# Patient Record
Sex: MALE
[De-identification: names, ages, dates, MRNs, and addresses within clinical notes are randomized per-mention and may not be internally consistent; named-entity substitution may affect disease eponyms.]

---

## 2018-05-08 ENCOUNTER — HOSPITAL ENCOUNTER (INPATIENT)
Dept: HOSPITAL 88 - ER | Age: 43
LOS: 4 days | Discharge: HOME | DRG: 103 | End: 2018-05-12
Attending: INTERNAL MEDICINE | Admitting: INTERNAL MEDICINE
Payer: COMMERCIAL

## 2018-05-08 VITALS — SYSTOLIC BLOOD PRESSURE: 122 MMHG | DIASTOLIC BLOOD PRESSURE: 74 MMHG

## 2018-05-08 VITALS — BODY MASS INDEX: 41.06 KG/M2 | HEIGHT: 72 IN | WEIGHT: 303.13 LBS

## 2018-05-08 VITALS — DIASTOLIC BLOOD PRESSURE: 74 MMHG | SYSTOLIC BLOOD PRESSURE: 122 MMHG

## 2018-05-08 DIAGNOSIS — E11.65: ICD-10-CM

## 2018-05-08 DIAGNOSIS — I10: ICD-10-CM

## 2018-05-08 DIAGNOSIS — G43.109: Primary | ICD-10-CM

## 2018-05-08 DIAGNOSIS — E78.5: ICD-10-CM

## 2018-05-08 DIAGNOSIS — E66.01: ICD-10-CM

## 2018-05-08 DIAGNOSIS — E87.6: ICD-10-CM

## 2018-05-08 DIAGNOSIS — R06.83: ICD-10-CM

## 2018-05-08 DIAGNOSIS — E86.0: ICD-10-CM

## 2018-05-08 DIAGNOSIS — G83.84: ICD-10-CM

## 2018-05-08 LAB
ALBUMIN SERPL-MCNC: 4 G/DL (ref 3.5–5)
ALBUMIN/GLOB SERPL: 1.1 {RATIO} (ref 0.8–2)
ALP SERPL-CCNC: 86 IU/L (ref 40–150)
ALT SERPL-CCNC: 46 IU/L (ref 0–55)
ANION GAP SERPL CALC-SCNC: 13.1 MMOL/L (ref 8–16)
BASOPHILS # BLD AUTO: 0 10*3/UL (ref 0–0.1)
BASOPHILS NFR BLD AUTO: 0.4 % (ref 0–1)
BUN SERPL-MCNC: 12 MG/DL (ref 7–26)
BUN/CREAT SERPL: 15 (ref 6–25)
CALCIUM SERPL-MCNC: 9.4 MG/DL (ref 8.4–10.2)
CHLORIDE SERPL-SCNC: 105 MMOL/L (ref 98–107)
CK MB SERPL-MCNC: 2.5 NG/ML (ref 0–5)
CK MB SERPL-MCNC: 2.6 NG/ML (ref 0–5)
CK SERPL-CCNC: 175 IU/L (ref 30–200)
CK SERPL-CCNC: 209 IU/L (ref 30–200)
CO2 SERPL-SCNC: 25 MMOL/L (ref 22–29)
DEPRECATED APTT PLAS QN: 27.4 SECONDS (ref 23.8–35.5)
DEPRECATED INR PLAS: 1.09
DEPRECATED NEUTROPHILS # BLD AUTO: 6.4 10*3/UL (ref 2.1–6.9)
EGFRCR SERPLBLD CKD-EPI 2021: > 60 ML/MIN (ref 60–?)
EOSINOPHIL # BLD AUTO: 0.1 10*3/UL (ref 0–0.4)
EOSINOPHIL NFR BLD AUTO: 0.7 % (ref 0–6)
ERYTHROCYTE [DISTWIDTH] IN CORD BLOOD: 14.1 % (ref 11.7–14.4)
GLOBULIN PLAS-MCNC: 3.5 G/DL (ref 2.3–3.5)
GLUCOSE SERPLBLD-MCNC: 166 MG/DL (ref 74–118)
HCT VFR BLD AUTO: 47.3 % (ref 38.2–49.6)
HGB BLD-MCNC: 15.8 G/DL (ref 14–18)
LYMPHOCYTES # BLD: 1.6 10*3/UL (ref 1–3.2)
LYMPHOCYTES NFR BLD AUTO: 19.1 % (ref 18–39.1)
MCH RBC QN AUTO: 27.7 PG (ref 28–32)
MCHC RBC AUTO-ENTMCNC: 33.4 G/DL (ref 31–35)
MCV RBC AUTO: 82.8 FL (ref 81–99)
MONOCYTES # BLD AUTO: 0.4 10*3/UL (ref 0.2–0.8)
MONOCYTES NFR BLD AUTO: 4.2 % (ref 4.4–11.3)
NEUTS SEG NFR BLD AUTO: 75.1 % (ref 38.7–80)
PLATELET # BLD AUTO: 191 X10E3/UL (ref 140–360)
POTASSIUM SERPL-SCNC: 3.1 MMOL/L (ref 3.5–5.1)
PROTHROMBIN TIME: 13.3 SECONDS (ref 11.9–14.5)
RBC # BLD AUTO: 5.71 X10E6/UL (ref 4.3–5.7)
SODIUM SERPL-SCNC: 140 MMOL/L (ref 136–145)
TSH SERPL DL<=0.005 MIU/L-ACNC: 0.76 UIU/ML (ref 0.35–4.94)

## 2018-05-08 PROCEDURE — 87086 URINE CULTURE/COLONY COUNT: CPT

## 2018-05-08 PROCEDURE — 83036 HEMOGLOBIN GLYCOSYLATED A1C: CPT

## 2018-05-08 PROCEDURE — 71275 CT ANGIOGRAPHY CHEST: CPT

## 2018-05-08 PROCEDURE — 83735 ASSAY OF MAGNESIUM: CPT

## 2018-05-08 PROCEDURE — 84443 ASSAY THYROID STIM HORMONE: CPT

## 2018-05-08 PROCEDURE — 36415 COLL VENOUS BLD VENIPUNCTURE: CPT

## 2018-05-08 PROCEDURE — 80053 COMPREHEN METABOLIC PANEL: CPT

## 2018-05-08 PROCEDURE — 93306 TTE W/DOPPLER COMPLETE: CPT

## 2018-05-08 PROCEDURE — 93005 ELECTROCARDIOGRAM TRACING: CPT

## 2018-05-08 PROCEDURE — 85025 COMPLETE CBC W/AUTO DIFF WBC: CPT

## 2018-05-08 PROCEDURE — 99284 EMERGENCY DEPT VISIT MOD MDM: CPT

## 2018-05-08 PROCEDURE — 82553 CREATINE MB FRACTION: CPT

## 2018-05-08 PROCEDURE — 70544 MR ANGIOGRAPHY HEAD W/O DYE: CPT

## 2018-05-08 PROCEDURE — 85610 PROTHROMBIN TIME: CPT

## 2018-05-08 PROCEDURE — 70551 MRI BRAIN STEM W/O DYE: CPT

## 2018-05-08 PROCEDURE — 80061 LIPID PANEL: CPT

## 2018-05-08 PROCEDURE — 84484 ASSAY OF TROPONIN QUANT: CPT

## 2018-05-08 PROCEDURE — 82550 ASSAY OF CK (CPK): CPT

## 2018-05-08 PROCEDURE — 70450 CT HEAD/BRAIN W/O DYE: CPT

## 2018-05-08 PROCEDURE — 81001 URINALYSIS AUTO W/SCOPE: CPT

## 2018-05-08 PROCEDURE — 85730 THROMBOPLASTIN TIME PARTIAL: CPT

## 2018-05-08 PROCEDURE — 80048 BASIC METABOLIC PNL TOTAL CA: CPT

## 2018-05-08 PROCEDURE — 71045 X-RAY EXAM CHEST 1 VIEW: CPT

## 2018-05-08 PROCEDURE — 70547 MR ANGIOGRAPHY NECK W/O DYE: CPT

## 2018-05-08 RX ADMIN — FAMOTIDINE SCH MG: 20 TABLET, FILM COATED ORAL at 18:15

## 2018-05-08 RX ADMIN — Medication PRN MG: at 22:24

## 2018-05-08 NOTE — DIAGNOSTIC IMAGING REPORT
History:Left-sided weakness



Comparison studies: None



Technique: 

Axial images were obtained from the skull base to the vertex.  

Coronal and sagittal reconstructions obtained from the axial data.



Findings:



Scalp/skull: 

No abnormalities. No fractures, blastic or lytic lesions.



Extra-axial spaces: 

No masses.  No fluid collections.



Brain sulci: Appropriate for age.

Ventricles: Normal in size and configuration. No hydrocephalus.



Parenchyma: 

No abnormal densities. 

No masses, hemorrhage, acute or chronic cortical vascular insults.



Sellar/suprasellar region: No abnormalities

Craniocervical junction: Patent foramen magnum.  No Chiari one malformation.



IMPRESSION:



No abnormalities . 



Signed by: DR Pete Huizar M.D. on 5/8/2018 4:05 PM

## 2018-05-08 NOTE — DIAGNOSTIC IMAGING REPORT
PROCEDURE:

A single AP view of the chest.

 

COMPARISON: Patients [facility], CT, CTA CHEST, 5/08/2018, 15:28.

 

INDICATIONS:   LEFT ARM NUMBNESS, SHORTNESS OF BREATH

     

FINDINGS:

Lines/tubes:  None.

 

Lungs:  The lungs are well inflated. There is no evidence of 

consolidation. Mild perihilar interstitial opacities

Pleura:  There is no pleural effusion or pneumothorax.

 

Heart and mediastinum: Cardiac silhouette is unremarkable. Mild central 

pulmonary venous congestion.

 

Bones:  No acute bony abnormality.

 

IMPRESSION: 

 

1. mild central pulmonary venous congestion and perihilar interstitial 

edema. 

 

 

Milad Daniels M.D.  

Dictated by:  Milad Daniels M.D. on 5/08/2018 at 17:21     

Electronically approved by:  Milad Daniels M.D. on 5/08/2018 at 

17:21

## 2018-05-08 NOTE — XMS REPORT
Patient Summary Document

 Created on: 2018



CHARITY HERNANDEZ

External Reference #: [id number]

: 1975

Sex: Male



Demographics







 Address  [address] [place] [address]

 

 Home Phone  [phone]

 

 Preferred Language  Unknown

 

 Marital Status  Unknown

 

 Scientologist Affiliation  Unknown

 

 Race  Unknown

 

 Additional Race(s)  

 

 Ethnic Group  Unknown





Author







 Author  [facility]

 

 Address  Unknown

 

 Phone  Unavailable







Care Team Providers







 Care Team Member Name  Role  Phone

 

 ANAYELI FLORENCE  Unavailable  Unavailable







Problems

This patient has no known problems.



Allergies, Adverse Reactions, Alerts

This patient has no known allergies or adverse reactions.



Medications

This patient has no known medications.



Results







 Test Description  Test Time  Test Comments  Text Results  Atomic Results  
Result Comments









 CT BRAIN WO            [place] [address]      Patient Name: CHARITY HERNANDEZ   
MR #: [id number]    : 1975 Age/Sex: 42/M  Acct #: [id number] Req #: 
[id number]  Adm Physician:     Ordered by: ANAYELI FLORENCE MD  Report #: [id number]
[id number]   Location: ER  Room/Bed:     _____________________________________________
______________________________________________________    Procedure: [id number] 
CT/CT BRAIN WO  Exam Date: 18                            Exam Time: 1524 
      REPORT STATUS: Signed    History:Left-sided weakness      Comparison 
studies: None      Technique:    Axial images were obtained from the skull base 
to the vertex.     Coronal and sagittal reconstructions obtained from the axial 
data.      Findings:      Scalp/skull:    No abnormalities. No fractures, 
blastic or lytic lesions.      Extra-axial spaces:    No masses.  No fluid 
collections.      Brain sulci: Appropriate for age.   Ventricles: Normal in 
size and configuration. No hydrocephalus.      Parenchyma:    No abnormal 
densities.    No masses, hemorrhage, acute or chronic cortical vascular 
insults.      Sellar/suprasellar region: No abnormalities   Craniocervical 
junction: Patent foramen magnum.  No Chiari one malformation.      IMPRESSION: 
     No abnormalities .       Signed by: DR Pete Huizar M.D. on 2018 4:05 PM        Dictated By: PETE HOBBS MD  Electronically Signed By
: PETE HOBBS MD on 18 1605  Transcribed By: ANEUDY on 18 
1605       COPY TO:   ANAYELI FLORENCE MD           

 

 CTA CHEST            [place] [address]      Patient Name: CHARITY HERNANDEZ   MR #: 
[id number]    : 1975 Age/Sex: 42/M  Acct #: [id number] Req #: [id number]
[id number]  Adm Physician:     Ordered by: JACINDA TAI NP  Report #: [id number]
[id number]   Location: ER  Room/Bed:     _____________________________________________
______________________________________________________    Procedure: [id number] 
CT/CTA CHEST  Exam Date: 18                            Exam Time: 1528   
    REPORT STATUS: Signed    EXAM: CTA OF THE THORACIC AORTA      INDICATION:  
                   COMPARISON: None.      TECHNIQUE: Multi-detector CT 
technology was employed. CTA Gated axial imaging   of the chest was performed 
after the administration of IV contrast.                         IV CONTRAST: 
100 mL of Isovue-370                     ORAL CONTRAST: None                  
COMPLICATIONS: None      RADIATION DOSE:        Total DLP: 1195.3 mGy*cm        
Estimated effective dose: (DLP x 0.015 x size factor) mSv        CTDIvol has 
been reviewed. It is below the limits set by the Radiation   Protocol Committee 
(RPC).      For optimization of anatomic evaluation, multiplanar reconstruction
, maximum   intensity projections, and advanced 3-D off-line postprocessing 
were performed   on a dedicated stand-alone workstation under the direct 
supervision of the   interpreting physician.         FINDINGS:   Potential 
study limitations: None.      LINES/ TUBES: None.      VASCULAR WITH ADVANCED 3-
D OFF-LINE POSTPROCESSING:   Aortic valve morphology is trileaflet and contains 
no calcifications.      The thoracic aorta is normal in course, caliber, and 
contour.    There is no   acute aortic pathology, such as dissection, 
intramural hematoma, or contained   rupture.        Aortic plaques: None.      
The arch vessel branching pattern is conventional.  All of the arch branch   
vessels appear widely patent in their proximal portions.         Representative 
dimensions of the thoracic aorta are as follows:   2.4 cm at the aortic annulus
   3.5 x 3.5 cm at the sinuses of Valsalva (the sinotubular junction is 
preserved)   3.5 cm at the mid ascending aorta   3.5 cm at the distal ascending 
aorta   2.6 cm at the mid transverse arch   2.4 cm at the proximal descending 
thoracic aorta   2.5 cm at the diaphragmatic hiatus.         LUNGS AND AIRWAYS:
  Minimal atelectasis in both lung bases.  Airways are   patent.      PLEURA: 
The pleural spaces are clear.      HEART AND MEDIASTINUM: The thyroid gland is 
normal.  No mediastinal, hilar or   axillary lymphadenopathy.  The main 
pulmonary artery is mildly prominent   measuring 3.1 cm.      The cardiac 
chambers demonstrate normal atrioventricular and ventriculoarterial   
concordance, and systemic and pulmonary venous return.  The cardiac chambers   
are normal in size.  The coronary arteries have normal  origins and courses.    
There are mild coronary calcifications identified in the proximal LAD, though   
this study was not optimized for coronary artery evaluation.  There is no   
pericardial effusion.      LIMITED ABDOMEN:   Diffuse hepatic steatosis.      
BONES:   Mild multilevel degenerative changes of the thoracic spine.         
IMPRESSION:      1.  Normal thoracic aorta.  There is no acute aortic 
pathology.      2.  Small calcified plaque in the proximal LAD.      3.  
Prominent main pulmonary artery measuring up to 3.1 cm with normal RV size,   
nonspecific finding.      4.  Minimal atelectasis in both lung bases, otherwise
, lungs are clear.            Signed by: Dr. Tanmay Shafer M.D. on 2018 4:12 PM        Dictated By: TANMAY SHAFER MD  Electronically 
Signed By: TANMAY SHAFER MD on 18  Transcribed By: ANEUDY 
on 18 161       COPY TO:   JACINDA TAI NP           

 

 CHEST SINGLE (PORTABLE)            [place] [address]      Patient Name: CHARITY HERNANDEZ   MR #: [id number]    : 1975 Age/Sex: 42/M  Acct #: 
[id number] Req #: [id number]  [facility] Physician:     Ordered by: JACINDA TAI 
NP  Report #: [id number]   Location: ER  Room/Bed:     __________________________
_________________________________________________________________________    
Procedure: [id number] DX/CHEST SINGLE (PORTABLE)  Exam Date: 18           
                 Exam Time: 1607       REPORT STATUS: Signed    PROCEDURE:   A 
single AP view of the chest.       COMPARISON: Patients [facility], CT, CTA 
CHEST, 2018, 15:28.       INDICATIONS:   LEFT ARM NUMBNESS, SHORTNESS OF 
BREATH           FINDINGS:   Lines/tubes:  None.       Lungs:  The lungs are 
well inflated. There is no evidence of    consolidation. Mild perihilar 
interstitial opacities   Pleura:  There is no pleural effusion or pneumothorax.
       Heart and mediastinum: Cardiac silhouette is unremarkable. Mild central 
   pulmonary venous congestion.       Bones:  No acute bony abnormality.       
IMPRESSION:        1. mild central pulmonary venous congestion and perihilar 
interstitial    edema.            Jose Daniels M.D.     Dictated by:  
Jose Daniels M.D. on 2018 at 17:21        Electronically approved by
:  Jose Daniels M.D. on 2018 at    17:21                Dictated By: 
JOSE DANIELS MD  Electronically Signed By: JOSE DANIELS MD on 18
  Transcribed By: MANNIE on 18 172       COPY TO:   JACINDA TAI NP

## 2018-05-08 NOTE — DIAGNOSTIC IMAGING REPORT
EXAM: CTA OF THE THORACIC AORTA



INDICATION:      

\S\r/o dissection

\S\[id number]

\S\1528



COMPARISON: None.



TECHNIQUE: Multi-detector CT technology was employed. CTA Gated axial imaging

of the chest was performed after the administration of IV contrast. 



                  IV CONTRAST: 100 mL of Isovue-370

                  ORAL CONTRAST: None

            

COMPLICATIONS: None



RADIATION DOSE:

     Total DLP: 1195.3 mGy*cm

     Estimated effective dose: (DLP x 0.015 x size factor) mSv

     CTDIvol has been reviewed. It is below the limits set by the Radiation

Protocol Committee (RPC).



For optimization of anatomic evaluation, multiplanar reconstruction, maximum

intensity projections, and advanced 3-D off-line postprocessing were performed

on a dedicated stand-alone workstation under the direct supervision of the

interpreting physician.





FINDINGS:

Potential study limitations: None.



LINES/ TUBES: None.



VASCULAR WITH ADVANCED 3-D OFF-LINE POSTPROCESSING:

Aortic valve morphology is trileaflet and contains no calcifications.



The thoracic aorta is normal in course, caliber, and contour.    There is no

acute aortic pathology, such as dissection, intramural hematoma, or contained

rupture.  



Aortic plaques: None.



The arch vessel branching pattern is conventional.  All of the arch branch

vessels appear widely patent in their proximal portions.





Representative dimensions of the thoracic aorta are as follows:

2.4 cm at the aortic annulus

3.5 x 3.5 cm at the sinuses of Valsalva (the sinotubular junction is preserved)

3.5 cm at the mid ascending aorta

3.5 cm at the distal ascending aorta

2.6 cm at the mid transverse arch

2.4 cm at the proximal descending thoracic aorta

2.5 cm at the diaphragmatic hiatus.





LUNGS AND AIRWAYS:  Minimal atelectasis in both lung bases.  Airways are

patent.



PLEURA: The pleural spaces are clear.



HEART AND MEDIASTINUM: The thyroid gland is normal.  No mediastinal, hilar or

axillary lymphadenopathy.  The main pulmonary artery is mildly prominent

measuring 3.1 cm.



The cardiac chambers demonstrate normal atrioventricular and ventriculoarterial

concordance, and systemic and pulmonary venous return.  The cardiac chambers

are normal in size.  The coronary arteries have normal  origins and courses. 

There are mild coronary calcifications identified in the proximal LAD, though

this study was not optimized for coronary artery evaluation.  There is no

pericardial effusion.



LIMITED ABDOMEN:

Diffuse hepatic steatosis.



BONES:

Mild multilevel degenerative changes of the thoracic spine.





IMPRESSION:



1.  Normal thoracic aorta.  There is no acute aortic pathology.



2.  Small calcified plaque in the proximal LAD.



3.  Prominent main pulmonary artery measuring up to 3.1 cm with normal RV size,

nonspecific finding.



4.  Minimal atelectasis in both lung bases, otherwise, lungs are clear.







Signed by: Dr. Sallie Hong M.D. on 5/8/2018 4:12 PM

## 2018-05-09 VITALS — SYSTOLIC BLOOD PRESSURE: 126 MMHG | DIASTOLIC BLOOD PRESSURE: 81 MMHG

## 2018-05-09 VITALS — SYSTOLIC BLOOD PRESSURE: 150 MMHG | DIASTOLIC BLOOD PRESSURE: 87 MMHG

## 2018-05-09 VITALS — DIASTOLIC BLOOD PRESSURE: 74 MMHG | SYSTOLIC BLOOD PRESSURE: 133 MMHG

## 2018-05-09 VITALS — SYSTOLIC BLOOD PRESSURE: 133 MMHG | DIASTOLIC BLOOD PRESSURE: 74 MMHG

## 2018-05-09 VITALS — DIASTOLIC BLOOD PRESSURE: 63 MMHG | SYSTOLIC BLOOD PRESSURE: 124 MMHG

## 2018-05-09 VITALS — DIASTOLIC BLOOD PRESSURE: 69 MMHG | SYSTOLIC BLOOD PRESSURE: 108 MMHG

## 2018-05-09 VITALS — SYSTOLIC BLOOD PRESSURE: 134 MMHG | DIASTOLIC BLOOD PRESSURE: 72 MMHG

## 2018-05-09 LAB
ANION GAP SERPL CALC-SCNC: 10.4 MMOL/L (ref 8–16)
BACTERIA URNS QL MICRO: (no result) /HPF
BASOPHILS # BLD AUTO: 0 10*3/UL (ref 0–0.1)
BASOPHILS NFR BLD AUTO: 0.2 % (ref 0–1)
BILIRUB UR QL: NEGATIVE
BUN SERPL-MCNC: 15 MG/DL (ref 7–26)
BUN/CREAT SERPL: 16 (ref 6–25)
CALCIUM SERPL-MCNC: 9.1 MG/DL (ref 8.4–10.2)
CHLORIDE SERPL-SCNC: 106 MMOL/L (ref 98–107)
CHOLEST SERPL-MCNC: 201 MD/DL (ref 0–199)
CHOLEST/HDLC SERPL: 6.3 {RATIO} (ref 3.9–4.7)
CK MB SERPL-MCNC: 1.7 NG/ML (ref 0–5)
CK SERPL-CCNC: 155 IU/L (ref 30–200)
CLARITY UR: (no result)
CO2 SERPL-SCNC: 29 MMOL/L (ref 22–29)
COLOR UR: YELLOW
DEPRECATED NEUTROPHILS # BLD AUTO: 4.8 10*3/UL (ref 2.1–6.9)
DEPRECATED RBC URNS MANUAL-ACNC: (no result) /HPF (ref 0–5)
EGFRCR SERPLBLD CKD-EPI 2021: > 60 ML/MIN (ref 60–?)
EOSINOPHIL # BLD AUTO: 0.2 10*3/UL (ref 0–0.4)
EOSINOPHIL NFR BLD AUTO: 2.2 % (ref 0–6)
EPI CELLS URNS QL MICRO: (no result) /LPF
ERYTHROCYTE [DISTWIDTH] IN CORD BLOOD: 14.5 % (ref 11.7–14.4)
GLUCOSE SERPLBLD-MCNC: 104 MG/DL (ref 74–118)
HCT VFR BLD AUTO: 45.4 % (ref 38.2–49.6)
HDLC SERPL-MSCNC: 32 MG/DL (ref 40–60)
HGB BLD-MCNC: 14.9 G/DL (ref 14–18)
KETONES UR QL STRIP.AUTO: NEGATIVE
LDLC SERPL CALC-MCNC: 131 MG/DL (ref 60–130)
LEUKOCYTE ESTERASE UR QL STRIP.AUTO: NEGATIVE
LYMPHOCYTES # BLD: 2.2 10*3/UL (ref 1–3.2)
LYMPHOCYTES NFR BLD AUTO: 27.5 % (ref 18–39.1)
MCH RBC QN AUTO: 28.1 PG (ref 28–32)
MCHC RBC AUTO-ENTMCNC: 32.8 G/DL (ref 31–35)
MCV RBC AUTO: 85.5 FL (ref 81–99)
MONOCYTES # BLD AUTO: 0.9 10*3/UL (ref 0.2–0.8)
MONOCYTES NFR BLD AUTO: 10.8 % (ref 4.4–11.3)
MUCOUS THREADS URNS QL MICRO: (no result)
NEUTS SEG NFR BLD AUTO: 58.9 % (ref 38.7–80)
NITRITE UR QL STRIP.AUTO: NEGATIVE
PLATELET # BLD AUTO: 187 X10E3/UL (ref 140–360)
POTASSIUM SERPL-SCNC: 3.4 MMOL/L (ref 3.5–5.1)
PROT UR QL STRIP.AUTO: (no result)
RBC # BLD AUTO: 5.31 X10E6/UL (ref 4.3–5.7)
SODIUM SERPL-SCNC: 142 MMOL/L (ref 136–145)
SP GR UR STRIP: 1.03 (ref 1.01–1.02)
TRIGL SERPL-MCNC: 190 MG/DL (ref 0–149)
UROBILINOGEN UR STRIP-MCNC: 0.2 MG/DL (ref 0.2–1)
WBC #/AREA URNS HPF: (no result) /HPF (ref 0–5)

## 2018-05-09 RX ADMIN — ENOXAPARIN SODIUM SCH MG: 40 INJECTION SUBCUTANEOUS at 17:24

## 2018-05-09 RX ADMIN — Medication PRN MG: at 20:38

## 2018-05-09 RX ADMIN — FAMOTIDINE SCH MG: 20 TABLET, FILM COATED ORAL at 07:59

## 2018-05-09 RX ADMIN — Medication PRN MG: at 07:59

## 2018-05-09 RX ADMIN — SIMVASTATIN SCH MG: 20 TABLET, FILM COATED ORAL at 20:38

## 2018-05-09 RX ADMIN — AMLODIPINE BESYLATE SCH MG: 10 TABLET ORAL at 07:59

## 2018-05-09 RX ADMIN — LOSARTAN POTASSIUM SCH MG: 100 TABLET, FILM COATED ORAL at 12:52

## 2018-05-09 RX ADMIN — FAMOTIDINE SCH MG: 20 TABLET, FILM COATED ORAL at 17:24

## 2018-05-09 RX ADMIN — Medication SCH MG: at 12:53

## 2018-05-09 RX ADMIN — ASPIRIN SCH MG: 81 TABLET, COATED ORAL at 07:59

## 2018-05-09 RX ADMIN — METOPROLOL SUCCINATE SCH MG: 50 TABLET, EXTENDED RELEASE ORAL at 08:00

## 2018-05-09 NOTE — HISTORY AND PHYSICAL
PRIMARY CARE PHYSICIAN:  Dr. Silvio Melgoza



CHIEF COMPLAINT:  Dizziness, left arm numbness and weakness.



HISTORY OF PRESENT ILLNESS:  This is a 42-year-old man with a history of 

hypertension, now developing left-sided weakness and dizziness.  He states 

that his dizziness started yesterday. He had left arm numbness, weakness 

and left shoulder pain.  He had left-sided shoulder pain.  He had a 

headache at the back of the head radiating to the eyes.  No vomiting.  No 

diarrhea.  No other symptoms.  No history of stroke.  No family history of 

stroke.



PAST MEDICAL HISTORY:  Hypertension.



PAST SURGICAL HISTORY:  None.



ALLERGIES:  PER ELECTRONIC MEDICAL RECORD.



FAMILY HISTORY:  No history of stroke.



SOCIAL HISTORY:  Patient is .  He has 2 children.  No alcohol, 

illicits or cigarettes.  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



MEDICATIONS:  Per electronic medical record.



REVIEW OF SYSTEMS:  Denies any shortness of breath or leg pain.



PHYSICAL EXAMINATION 

VITAL SIGNS:  Reviewed.

GENERAL:  A tired-appearing man resting in bed. 

HEENT:  Anicteric.  

CARDIOVASCULAR:  Normal S1 and S2.  

LUNGS:  Moderate breath sounds.  

ABDOMEN:  Soft, nontender and nondistended.  

EXTREMITIES:  No edema or calf tenderness.

NEUROLOGICAL:  Alert and oriented times 3.  Moving all extremities.  He has 

no focal deficits.  He has no visual field deficits.  Motor strength is 5/5 

in all extremities, except the left arm, which is 4/5.  He does have left 

anterior shoulder pain with motor testing of the left arm.

SKIN:  Dry.

PSYCHIATRIC:  Flat affect.



LABS:  Reviewed.



MEDICATIONS:  Reviewed.



ASSESSMENT AND PLAN:  A 42-year-old man with:

1.  Musculoskeletal left arm pain:  Will treat empirically.  Likely bicep 

tendon related pathology.

2.  Rule out acute stroke:  Computerized tomography scan is negative.  Will 

obtain an MRI of the brain to further evaluate.  Also, obtain MRI of the 

head and neck.

3.  Hypokalemia:  Replace and recheck.

4.  Morbid obesity:  Body mass index is 44.  He also has hyperglycemia:  

Will screen for diabetes.

5.  Hyperglycemia:  Screen for diabetes. 

6.  Hyperlipidemia:  His LDL is 131.  His triglycerides are 190.  Will 

treat empirically with statin medication.

7.  Hypertension:  Restart home medications.

8.  Prophylaxis:  Will use Lovenox and Pepcid.



9.  Disposition:  MRI of the head and neck.  Physical therapy consultation. 

 Possible ultrasound of the carotids.  Likely, symptoms are due to bicep 

tendonitis or bicep tendon pathology.  Will obtain MRI to rule out acute 

stroke. 









DD:  05/09/2018 07:32

DT:  05/09/2018 07:41

Job#:  [id number]

## 2018-05-09 NOTE — CONSULTATION
DATE OF CONSULTATION:  May 09, 2018 



HISTORY OF PRESENT ILLNESS:  Mr. Greer is a 42-year-old right-hand dominant 

man with past medical history significant for hypertension and prediabetes, 

admitted to [facility] on 2018, for evaluation and 

treatment of headache and associated neurological deficits.



On the afternoon of 2018, the patient was working from home when 

he experienced the sudden onset of diaphoresis, blurred vision affecting both 

eyes, left arm and foot tingling with left hemiparesis, nausea with dry 

heaves, unsteady gait, mild dysarthria, expressive aphasia, and 

confusion/disorientation.  These symptoms occurred in the presence of a

headache which is described as follows:  The pain begins over the right occiput 

and radiates through the skull to behind the right eye.  The pain is described 

as throbbing and "lots of pressure."  Mr. Greer rates the pain at 8 to 

9/10.  Associated with the headache are photophobia and phonophobia.



Following the sudden onset of the above symptoms, Mr. Greer contacted his 

wife who returned to their home and then drove him to the emergency center 

at [facility] for further evaluation.  



Upon admission to the emergency center, the patient was afebrile with a 

blood pressure of 150/80 mmHg with a pulse of 85 beats per minute.  The 

patient's neurological examination was documented as follows:  Alert.  

Oriented times 3.  Mood/affect normal.  Speech normal.  Cranial nerves 

normal (as tested).  Finger-nose test mildly abnormal on the left.  No 

motor deficits.  No sensory deficits.  A CT of the brain without contrast 

was performed while the patient was in the emergency Center.  There was no 

evidence of recent large territorial ischemia or hemorrhage.  

Mr. Greer was admitted to [facility] under observation status 

for further evaluation and treatment of his symptoms.



The patient reports experiencing similar symptoms 2 days prior to 

admission.  However, symptoms were less severe in their intensity.  At 

present, the patient endorses a mild headache as described above, left arm 

numbness and weakness, tingling of the left foot, and an unsteady gait.  

Mr. Greer does not have a prior history of headaches.



REVIEW OF SYSTEMS:  Shortness of breath, nausea, confusion, blurred vision, 

dysarthria, expressive aphasia, weakness of the left arm and leg, tingling 

of the left arm and left foot, impairment of balance and gait, headache, 

photophobia, phonophobia.  Otherwise, a 12-point review of systems is 

negative.  



PAST MEDICAL HISTORY:  Hypertension, prediabetes, morbid obesity.



PAST SURGICAL HISTORY:  Appendectomy as a child.



PAST HOSPITALIZATIONS:  Surgeries/procedures listed, blood transfusion 

during adolescence.



FAMILY HISTORY:  The patient's paternal and maternal grandparents are 

.  Their medical histories are unknown.  The patient's father is 

alive and healthy.  His mother is alive and has hypertension.  Mr. Greer 

reports having 1 brother and 2 sisters, all of whom are alive and healthy.  

The patient has a twin daughters who are healthy.



SOCIAL HISTORY:  Mr. Greer is .  He graduated from high school and 

attended 2 years of college.  He works from his home as an .  

The patient does not report current or prior tobacco, alcohol, or 

recreational drug use.



HOME MEDICATIONS

1.  Amlodipine besylate 10 mg by mouth daily.

2.  Losartan/hydrochlorothiazide 100-125 mg by mouth daily.

3.  Metoprolol  mg by mouth daily.



HOSPITAL MEDICATIONS:  Amlodipine besylate, Lovenox, Pepcid, 

hydrochlorothiazide, losartan, metoprolol, morphine sulfate, nitroglycerin, 

Zofran and Zocor.



ALLERGIES:  NO KNOWN DRUG ALLERGIES.  NO KNOWN FOOD ALLERGIES.  NO KNOWN 

ALLERGIES TO LATEX.  NO KNOWN ALLERGIES TO IODINE OR OTHER CONTRAST 

MATERIALS.



PHYSICAL EXAMINATION

VITAL SIGNS:  Height 72 inches, weight 300 pounds, BMI 40.7 kg/m sq.  Blood 

pressure 134/72 mmHg, pulse 92 beats per minute, respiratory rate 19 

breaths per minute, oxygen saturation 98% on 2 L by nasal cannula. 

GENERAL:  The patient is awake and alert, does not appear distressed.  

Obese. 

HEENT:  Normocephalic, atraumatic.  Pupils are equal, round and reactive to 

light.  Moist mucous membranes. 

NECK:  Supple.  No appreciable thyromegaly.  No appreciable carotid bruits. 

CARDIOVASCULAR:  S1, S2, regular rate and rhythm.  No murmurs, rubs or 

gallops. 

RESPIRATORY:  Clear to auscultation bilaterally.  No wheezes, rhonchi or 

rales.  

EXTREMITIES:  Skin is warm and dry.  No clubbing, cyanosis, or edema.  The 

posterior tibial and dorsalis pedis pulses are 2+ and symmetric.

SKIN:  No rashes or lesions.  

NEUROLOGIC  

MEMORY/ATTENTION:  The patient is awake and alert, oriented to person, 

place, time, and situation.

CRANIAL NERVES:  Cranial nerve I-not tested.  Cranial nerves II, III, IV, 

and VI-pupils are equal and round, react briskly to light (from 4 mm to 2 

mm), extraocular movements intact, no nystagmus.  Cranial nerve V-sensation 

to light touch and pinprick is diminished over the left V1 through V3 

distributions.  Strength of the temporalis and masseter muscles is within 

normal limits.  Cranial nerve VII-the face is symmetric as are all facial 

movements.  Strength is within normal limits.  Cranial nerve VIII-hearing 

is diminished to finger rub on the right.  Cranial nerves IX, X-the soft 

palate elevates equally and symmetrically.  Cranial nerve XI-normal 

strength of the bilateral sternocleidomastoid and trapezius muscles.  

Cranial nerve XII-the tongue protrudes in midline and moves symmetrically 

from side to side.

STRENGTH:  Bulk is normal.  Strength is 5/5 in the right deltoid, biceps, 

triceps, wrist flexors and extensors, finger flexors and extensors, 

intrinsic hand muscles, hip flexors, knee flexors and extensors, ankle 

dorsiflexion and plantarflexion, and intrinsic foot muscles.  Strength is 

as follows for the left arm and leg:  Deltoid 4/5, biceps 4/5, triceps 

4-/5, wrist flexors 4/5, wrist extensors 4-/5, finger flexors 4/5, finger 

extensors 4-/5, hip flexors4-/5, knee flexors 4-/5, knee extensors 4/5, 

ankle dorsiflexion 4-/5, ankle plantarflexion 4/5.  Tone is normal.  

DTRs:  Deep tendon reflexes are 2+ at the triceps, biceps, brachioradialis, 

patella, and Achilles.  Deep tendon reflexes are 3+ at the left triceps, 

biceps, brachioradialis, and patella.  Deep tendon reflexes are 2+ at the 

left Achilles.  Plantar responses are flexor bilaterally.  Absent clonus.  

SENSATION:  Sensation is diminished to light touch and pinprick over the 

left arm and left leg.

CEREBELLAR:  Finger-nose-finger and heel-shin movements are intact without 

dysmetria or other impairment.  Rapid alternating movements are intact. 

GAIT:  Deferred. 

SPEECH:  Spontaneous, speech is normal without appreciable dysarthria or 

aphasia.  Repetition is intact. 

INVOLUNTARY MOVEMENTS:  None.  

PRONATOR DRIFT:  Left arm.  



LABORATORY DATA:  Sodium 142, potassium 3.4, chloride 106, carbon dioxide 

29, anion gap 10.4, BUN 15, creatinine 0.93, estimated GFR greater than 60, 

BUN to creatinine ratio 16, glucose 104 and calcium 9.1.  Total bilirubin 

0.8, AST 21, ALT 46, alkaline phosphatase 86.  Total protein 7.5, albumin 

4.0, globulin 3.5, albumin to globulin ratio 1.1.  , 175, 155.  CK-MB 

2.50, 2.60, 1.70.  Troponin I less than 0.001, less than 0.001, less than 

0.001.  TSH 0.755, hemoglobin A1c 5.7.  Total cholesterol 201, 

triglycerides 190, LDL cholesterol 131, HDL cholesterol 32.  CBC with 

differential and platelets reveals a white blood cell count of 8.15 with 

normal differentials.  The hemoglobin and hematocrit are 14.9 to 45.4, 

respectively.  The platelet count is 187,000.  PT of 13.2, INR 1.09, PTT 

27.4.  Urinalysis revealed slightly cloudy urine with specific gravity of 

1.030 and 2+ protein.



DIAGNOSTIC STUDIES:  CT of the brain without contrast May 8, 2018:  On my 

review, there is no evidence of recent large territorial ischemia, 

hemorrhage, mass or mass affect.



ASSESSMENT AND PLAN:  Mr. Greer is a 42-year-old right-hand dominant man 

with vascular risk factors who presents to [facility] with 

either a subcortical ischemic stroke in the right middle cerebral artery 

distribution or a complex migraine with status migrainosus.  The 

patient's neurological examination is significant for left hemiparesis 

affecting the arm and leg, brisk deep tendon reflexes over the left arm and 

left leg, diminished sensation to light touch and pinprick over the left 

hemibody (face, arm and leg).  The patient's laboratory data and diagnostic 

studies have been reviewed and are documented above.



As stated above, the patient's differential diagnosis includes a 

subcortical ischemic stroke in the right middle cerebral artery 

distribution versus a complex migraine with status migrainosus.  In order 

to determine which diagnosis is correct, the patient must undergo a MRI of 

the brain without contrast.  Unfortunately, Mr. Greer has severe 

claustrophobia and was unable to undergo an MRI of the brain earlier today 

despite sedation.



A consultation to anesthesiology will be made so that the MRI of the brain 

and MRA of the brain and neck may be performed under sedation.  As stated 

above, it is vital this study be performed because it will dictate further 

evaluation and treatment for this patient.



Thank you for this consultation.  I will continue to follow this patient 

while he remains in the hospital.  



TIME SPENT:  50 minutes.









DD:  2018 19:19   DT:  2018 21:22   [facility]  [id number] [facility]

## 2018-05-10 VITALS — DIASTOLIC BLOOD PRESSURE: 89 MMHG | SYSTOLIC BLOOD PRESSURE: 148 MMHG

## 2018-05-10 VITALS — DIASTOLIC BLOOD PRESSURE: 77 MMHG | SYSTOLIC BLOOD PRESSURE: 121 MMHG

## 2018-05-10 VITALS — SYSTOLIC BLOOD PRESSURE: 129 MMHG | DIASTOLIC BLOOD PRESSURE: 60 MMHG

## 2018-05-10 VITALS — DIASTOLIC BLOOD PRESSURE: 46 MMHG | SYSTOLIC BLOOD PRESSURE: 98 MMHG

## 2018-05-10 VITALS — DIASTOLIC BLOOD PRESSURE: 77 MMHG | SYSTOLIC BLOOD PRESSURE: 128 MMHG

## 2018-05-10 VITALS — SYSTOLIC BLOOD PRESSURE: 121 MMHG | DIASTOLIC BLOOD PRESSURE: 77 MMHG

## 2018-05-10 VITALS — SYSTOLIC BLOOD PRESSURE: 135 MMHG | DIASTOLIC BLOOD PRESSURE: 78 MMHG

## 2018-05-10 VITALS — DIASTOLIC BLOOD PRESSURE: 60 MMHG | SYSTOLIC BLOOD PRESSURE: 129 MMHG

## 2018-05-10 RX ADMIN — SIMVASTATIN SCH MG: 20 TABLET, FILM COATED ORAL at 21:07

## 2018-05-10 RX ADMIN — Medication PRN MG: at 14:59

## 2018-05-10 RX ADMIN — METOPROLOL SUCCINATE SCH MG: 50 TABLET, EXTENDED RELEASE ORAL at 08:49

## 2018-05-10 RX ADMIN — Medication SCH MG: at 08:48

## 2018-05-10 RX ADMIN — FAMOTIDINE SCH MG: 20 TABLET, FILM COATED ORAL at 16:46

## 2018-05-10 RX ADMIN — AMLODIPINE BESYLATE SCH MG: 10 TABLET ORAL at 08:48

## 2018-05-10 RX ADMIN — ASPIRIN SCH MG: 81 TABLET, COATED ORAL at 08:48

## 2018-05-10 RX ADMIN — FAMOTIDINE SCH MG: 20 TABLET, FILM COATED ORAL at 08:48

## 2018-05-10 RX ADMIN — ENOXAPARIN SODIUM SCH MG: 40 INJECTION SUBCUTANEOUS at 16:46

## 2018-05-10 RX ADMIN — LOSARTAN POTASSIUM SCH MG: 100 TABLET, FILM COATED ORAL at 16:46

## 2018-05-11 VITALS — SYSTOLIC BLOOD PRESSURE: 139 MMHG | DIASTOLIC BLOOD PRESSURE: 66 MMHG

## 2018-05-11 VITALS — DIASTOLIC BLOOD PRESSURE: 68 MMHG | SYSTOLIC BLOOD PRESSURE: 115 MMHG

## 2018-05-11 VITALS — DIASTOLIC BLOOD PRESSURE: 58 MMHG | SYSTOLIC BLOOD PRESSURE: 103 MMHG

## 2018-05-11 VITALS — DIASTOLIC BLOOD PRESSURE: 55 MMHG | SYSTOLIC BLOOD PRESSURE: 112 MMHG

## 2018-05-11 VITALS — DIASTOLIC BLOOD PRESSURE: 56 MMHG | SYSTOLIC BLOOD PRESSURE: 110 MMHG

## 2018-05-11 VITALS — SYSTOLIC BLOOD PRESSURE: 112 MMHG | DIASTOLIC BLOOD PRESSURE: 55 MMHG

## 2018-05-11 VITALS — DIASTOLIC BLOOD PRESSURE: 70 MMHG | SYSTOLIC BLOOD PRESSURE: 132 MMHG

## 2018-05-11 LAB
ANION GAP SERPL CALC-SCNC: 11.6 MMOL/L (ref 8–16)
BASOPHILS # BLD AUTO: 0 10*3/UL (ref 0–0.1)
BASOPHILS NFR BLD AUTO: 0.3 % (ref 0–1)
BUN SERPL-MCNC: 10 MG/DL (ref 7–26)
BUN/CREAT SERPL: 12 (ref 6–25)
CALCIUM SERPL-MCNC: 9.4 MG/DL (ref 8.4–10.2)
CHLORIDE SERPL-SCNC: 101 MMOL/L (ref 98–107)
CO2 SERPL-SCNC: 30 MMOL/L (ref 22–29)
DEPRECATED NEUTROPHILS # BLD AUTO: 4.2 10*3/UL (ref 2.1–6.9)
EGFRCR SERPLBLD CKD-EPI 2021: > 60 ML/MIN (ref 60–?)
EOSINOPHIL # BLD AUTO: 0.1 10*3/UL (ref 0–0.4)
EOSINOPHIL NFR BLD AUTO: 1.8 % (ref 0–6)
ERYTHROCYTE [DISTWIDTH] IN CORD BLOOD: 14 % (ref 11.7–14.4)
GLUCOSE SERPLBLD-MCNC: 105 MG/DL (ref 74–118)
HCT VFR BLD AUTO: 49.2 % (ref 38.2–49.6)
HGB BLD-MCNC: 16 G/DL (ref 14–18)
LYMPHOCYTES # BLD: 2.1 10*3/UL (ref 1–3.2)
LYMPHOCYTES NFR BLD AUTO: 29.4 % (ref 18–39.1)
MAGNESIUM SERPL-MCNC: 1.8 MG/DL (ref 1.3–2.1)
MCH RBC QN AUTO: 27.4 PG (ref 28–32)
MCHC RBC AUTO-ENTMCNC: 32.5 G/DL (ref 31–35)
MCV RBC AUTO: 84.1 FL (ref 81–99)
MONOCYTES # BLD AUTO: 0.7 10*3/UL (ref 0.2–0.8)
MONOCYTES NFR BLD AUTO: 9.4 % (ref 4.4–11.3)
NEUTS SEG NFR BLD AUTO: 58.7 % (ref 38.7–80)
PLATELET # BLD AUTO: 185 X10E3/UL (ref 140–360)
POTASSIUM SERPL-SCNC: 3.6 MMOL/L (ref 3.5–5.1)
RBC # BLD AUTO: 5.85 X10E6/UL (ref 4.3–5.7)
SODIUM SERPL-SCNC: 139 MMOL/L (ref 136–145)

## 2018-05-11 RX ADMIN — FAMOTIDINE SCH MG: 20 TABLET, FILM COATED ORAL at 17:56

## 2018-05-11 RX ADMIN — Medication SCH MG: at 09:50

## 2018-05-11 RX ADMIN — SIMVASTATIN SCH MG: 20 TABLET, FILM COATED ORAL at 20:52

## 2018-05-11 RX ADMIN — ENOXAPARIN SODIUM SCH MG: 40 INJECTION SUBCUTANEOUS at 17:56

## 2018-05-11 RX ADMIN — FAMOTIDINE SCH MG: 20 TABLET, FILM COATED ORAL at 09:50

## 2018-05-11 RX ADMIN — ASPIRIN SCH MG: 81 TABLET, COATED ORAL at 09:50

## 2018-05-11 RX ADMIN — METOPROLOL SUCCINATE SCH MG: 50 TABLET, EXTENDED RELEASE ORAL at 09:50

## 2018-05-11 RX ADMIN — AMLODIPINE BESYLATE SCH MG: 10 TABLET ORAL at 09:50

## 2018-05-11 RX ADMIN — LOSARTAN POTASSIUM SCH MG: 100 TABLET, FILM COATED ORAL at 09:50

## 2018-05-11 NOTE — DIAGNOSTIC IMAGING REPORT
History: Left sided weakness

Comparison studies: Same day CT head



Technique: 

Pre-contrast: Sagittal T2; axial T1-IR, MPGR, DWI, T2 FLAIR.

Post-contrast: axial and coronal T1. 

2-D cervical and 3-D intracranial time-of-flight MRA's .

Intravenous contrast: None



Findings:



Brain:



Scalp: No abnormal signal.  No masses.

Bone marrow: Normal in signal intensity.



Brain sulci: Mildly prominent .

Ventricles: Normal in size . No hydrocephalus.



Parenchyma:

No abnormal signal intensities.

No masses, hemorrhage, acute or chronic vascular insults.



Suprasellar region: No abnormalities.

Craniocervical junction: No abnormalities.  Patent foramen magnum.  No Chiari

one malformation.

Vessels: Normal flow-voids in the arteries and sinuses.



Cervical MRA:



Carotid arteries: 

No flow abnormalities .



Vertebral arteries: 

No flow abnormalities .



Intracranial MRA:



Internal carotid arteries:

No flow abnormalities . Hypoplastic left A1.



Vertebrobasilar circulation:

No flow abnormalities .



Anatomical variants:

Acom: Visualized.

Pcoms: Visualized on the left  . 

Vertebral arteries: Dominant left fetal origin.



IMPRESSION:



Brain:



1.  Normal





Cervical and intracranial MRAs:



1.  Normal





Signed by: DR Pete Huizar M.D. on 5/11/2018 6:54 PM

## 2018-05-12 VITALS — DIASTOLIC BLOOD PRESSURE: 56 MMHG | SYSTOLIC BLOOD PRESSURE: 107 MMHG

## 2018-05-12 VITALS — SYSTOLIC BLOOD PRESSURE: 130 MMHG | DIASTOLIC BLOOD PRESSURE: 71 MMHG

## 2018-05-12 VITALS — DIASTOLIC BLOOD PRESSURE: 60 MMHG | SYSTOLIC BLOOD PRESSURE: 119 MMHG

## 2018-05-12 VITALS — SYSTOLIC BLOOD PRESSURE: 130 MMHG | DIASTOLIC BLOOD PRESSURE: 70 MMHG

## 2018-05-12 VITALS — DIASTOLIC BLOOD PRESSURE: 68 MMHG | SYSTOLIC BLOOD PRESSURE: 127 MMHG

## 2018-05-12 VITALS — SYSTOLIC BLOOD PRESSURE: 107 MMHG | DIASTOLIC BLOOD PRESSURE: 62 MMHG

## 2018-05-12 LAB
ANION GAP SERPL CALC-SCNC: 12.8 MMOL/L (ref 8–16)
BASOPHILS # BLD AUTO: 0 10*3/UL (ref 0–0.1)
BASOPHILS NFR BLD AUTO: 0.1 % (ref 0–1)
BUN SERPL-MCNC: 15 MG/DL (ref 7–26)
BUN/CREAT SERPL: 17 (ref 6–25)
CALCIUM SERPL-MCNC: 10 MG/DL (ref 8.4–10.2)
CHLORIDE SERPL-SCNC: 97 MMOL/L (ref 98–107)
CO2 SERPL-SCNC: 27 MMOL/L (ref 22–29)
DEPRECATED NEUTROPHILS # BLD AUTO: 8 10*3/UL (ref 2.1–6.9)
EGFRCR SERPLBLD CKD-EPI 2021: > 60 ML/MIN (ref 60–?)
EOSINOPHIL # BLD AUTO: 0 10*3/UL (ref 0–0.4)
EOSINOPHIL NFR BLD AUTO: 0 % (ref 0–6)
ERYTHROCYTE [DISTWIDTH] IN CORD BLOOD: 13.6 % (ref 11.7–14.4)
GLUCOSE SERPLBLD-MCNC: 135 MG/DL (ref 74–118)
HCT VFR BLD AUTO: 48.9 % (ref 38.2–49.6)
HGB BLD-MCNC: 16.6 G/DL (ref 14–18)
LYMPHOCYTES # BLD: 1.6 10*3/UL (ref 1–3.2)
LYMPHOCYTES NFR BLD AUTO: 16 % (ref 18–39.1)
MCH RBC QN AUTO: 28 PG (ref 28–32)
MCHC RBC AUTO-ENTMCNC: 33.9 G/DL (ref 31–35)
MCV RBC AUTO: 82.5 FL (ref 81–99)
MONOCYTES # BLD AUTO: 0.4 10*3/UL (ref 0.2–0.8)
MONOCYTES NFR BLD AUTO: 4 % (ref 4.4–11.3)
NEUTS SEG NFR BLD AUTO: 79.4 % (ref 38.7–80)
PLATELET # BLD AUTO: 217 X10E3/UL (ref 140–360)
POTASSIUM SERPL-SCNC: 3.8 MMOL/L (ref 3.5–5.1)
RBC # BLD AUTO: 5.93 X10E6/UL (ref 4.3–5.7)
SODIUM SERPL-SCNC: 133 MMOL/L (ref 136–145)

## 2018-05-12 RX ADMIN — LOSARTAN POTASSIUM SCH MG: 100 TABLET, FILM COATED ORAL at 09:11

## 2018-05-12 RX ADMIN — ENOXAPARIN SODIUM SCH MG: 40 INJECTION SUBCUTANEOUS at 16:41

## 2018-05-12 RX ADMIN — Medication SCH MG: at 09:00

## 2018-05-12 RX ADMIN — FAMOTIDINE SCH MG: 20 TABLET, FILM COATED ORAL at 07:30

## 2018-05-12 RX ADMIN — ASPIRIN SCH MG: 81 TABLET, COATED ORAL at 09:11

## 2018-05-12 RX ADMIN — METOPROLOL SUCCINATE SCH MG: 50 TABLET, EXTENDED RELEASE ORAL at 09:12

## 2018-05-12 RX ADMIN — FAMOTIDINE SCH MG: 20 TABLET, FILM COATED ORAL at 16:41

## 2018-05-12 RX ADMIN — AMLODIPINE BESYLATE SCH MG: 10 TABLET ORAL at 09:12

## 2018-05-13 NOTE — DISCHARGE SUMMARY
YOB: 1975 



PRIMARY CARE PHYSICIAN:  Dr. Napoleon Melgoza 



ADMITTING DIAGNOSES 

1. Complex migraine with Franklin's paralysis. 

2. Hypertension. 

3. Hyperlipidemia. 



DISCHARGE DIAGNOSES 

1. Complex migraine with Franklin's paralysis. 

2. Hypertension. 

3. Hyperlipidemia. 



BRIEF HISTORY:  Mr. Greer is a 42-year-old gentleman presenting with a 

severe headache, migraine in nature with weakness of the left arm and leg 

and numbness as well of the left arm, leg, and foot.  



HOSPITAL COURSE:  The patient was admitted to the hospital.  His imaging 

was negative.  He was seen by neurology, who felt that he had a complex 

migraine with Franklin's paralysis.  It was resolving during his hospital stay. 

 The patient was ambulating well at the time of discharge, still with only 

very mild residual weakness in the left arm and leg, but certainly 

improving.  He was instructed to resume his regular diet and activity to 

resume his regular medications and he can return to work in a week after he 

has completely recovered.  

He can follow up with his PCP within 2 weeks. 







 _________________________________

CJ THOMPSON MD



DD:  05/12/2018 18:44

DT:  05/12/2018 23:55

Job#:  [id number]

## 2021-07-26 NOTE — DIAGNOSTIC IMAGING REPORT
History: Left sided weakness

Comparison studies: Same day CT head



Technique: 

Pre-contrast: Sagittal T2; axial T1-IR, MPGR, DWI, T2 FLAIR.

Post-contrast: axial and coronal T1. 

2-D cervical and 3-D intracranial time-of-flight MRA's .

Intravenous contrast: None



Findings:



Brain:



Scalp: No abnormal signal.  No masses.

Bone marrow: Normal in signal intensity.



Brain sulci: Mildly prominent .

Ventricles: Normal in size . No hydrocephalus.



Parenchyma:

No abnormal signal intensities.

No masses, hemorrhage, acute or chronic vascular insults.



Suprasellar region: No abnormalities.

Craniocervical junction: No abnormalities.  Patent foramen magnum.  No Chiari

one malformation.

Vessels: Normal flow-voids in the arteries and sinuses.



Cervical MRA:



Carotid arteries: 

No flow abnormalities .



Vertebral arteries: 

No flow abnormalities .



Intracranial MRA:



Internal carotid arteries:

No flow abnormalities . Hypoplastic left A1.



Vertebrobasilar circulation:

No flow abnormalities .



Anatomical variants:

Acom: Visualized.

Pcoms: Visualized on the left  . 

Vertebral arteries: Dominant left fetal origin.



IMPRESSION:



Brain:



1.  Normal





Cervical and intracranial MRAs:



1.  Normal





Signed by: DR Pete Huizar M.D. on 5/11/2018 6:54 PM Detail Level: Detailed Size Of Lesion: 3mm